# Patient Record
Sex: MALE | Race: ASIAN | URBAN - METROPOLITAN AREA
[De-identification: names, ages, dates, MRNs, and addresses within clinical notes are randomized per-mention and may not be internally consistent; named-entity substitution may affect disease eponyms.]

---

## 2018-01-14 ENCOUNTER — OFFICE VISIT (OUTPATIENT)
Dept: URGENT CARE | Facility: URGENT CARE | Age: 28
End: 2018-01-14
Payer: COMMERCIAL

## 2018-01-14 VITALS
TEMPERATURE: 98 F | DIASTOLIC BLOOD PRESSURE: 70 MMHG | SYSTOLIC BLOOD PRESSURE: 104 MMHG | HEART RATE: 56 BPM | BODY MASS INDEX: 25.76 KG/M2 | WEIGHT: 170 LBS | OXYGEN SATURATION: 97 % | HEIGHT: 68 IN

## 2018-01-14 DIAGNOSIS — S05.8X2A EYE ABRASION, LEFT, INITIAL ENCOUNTER: Primary | ICD-10-CM

## 2018-01-14 PROCEDURE — 99203 OFFICE O/P NEW LOW 30 MIN: CPT | Performed by: FAMILY MEDICINE

## 2018-01-14 ASSESSMENT — ENCOUNTER SYMPTOMS
VOMITING: 0
EYE REDNESS: 1
PHOTOPHOBIA: 1
DOUBLE VISION: 0
EYE DISCHARGE: 1
BLURRED VISION: 1
ABDOMINAL PAIN: 0
CONSTIPATION: 0
EYE PAIN: 1
FEVER: 0
NAUSEA: 0
COUGH: 0
DIARRHEA: 0
SHORTNESS OF BREATH: 0
CHILLS: 0
DYSURIA: 0
FREQUENCY: 0

## 2018-01-14 NOTE — MR AVS SNAPSHOT
After Visit Summary   1/14/2018    Ricardo López    MRN: [id number]           Patient Information     Date Of Birth          1990        Visit Information        Provider Department      1/14/2018 10:15 AM Wm Devine MD [facility]        Today's Diagnoses     Eye abrasion, left, initial encounter    -  1      Care Instructions      Corneal Abrasion    You have received a scratch or scrape (abrasion) to your cornea. The cornea is the clear part in the front of the eye. This sensitive area is very painful when injured. You may make tears frequently, and your vision may be blurry until the injury heals. You may be sensitive to light.  This part of the body heals quickly. You can expect the pain to go away within 24 to 48 hours. If the abrasion is large or deep, your doctor may apply an eye patch, although this is not always done. An antibiotic ointment or eye drops may also be used to prevent infection.  Numbing drops may be used to relieve the pain temporarily so that your eyes can be examined. But these drops can t be prescribed for home use because that would prevent healing and lead to more serious problems. Also, if you can t feel your eye, there is a chance of accidentally injuring it further without knowing it.  Home care    A cold pack may be applied over the eye (or eye patch) for 20 minutes at a time, to reduce pain. To make a cold pack, put ice cubes in a plastic bag that seals at the top. Wrap the bag in a clean, thin towel or cloth.    You may use acetaminophen or ibuprofen to control pain, unless another pain medicine was prescribed. Note: If you have chronic liver or kidney disease or have ever had a stomach ulcer or GI bleeding, talk with your doctor before using these medicines.    Rest your eyes and don t read until symptoms are gone.    If you use contact lenses, don t wear them until all symptoms are gone.    If your vision is affected by the corneal  abrasion or if an eye patch was applied, don t drive a motor vehicle or operate machinery until all symptoms are gone. You may have trouble judging distances using only one eye.    If your eyes are sensitive to light, try wearing sunglasses, or stay indoors until symptoms go away.  Follow-up care  Follow up with your healthcare provider, or as advised.    If no patch was put on your eye and the pain continues for more than 48 hours, you should have another exam. Contact your healthcare provider to arrange this.    If your eye was patched and you were asked to remove the patch yourself, see your healthcare provider. Contact your healthcare provider if you still have pain after the patch is removed.    If you were given a return appointment for patch removal and re-examination, be sure to keep the appointment. Leaving the patch in place longer than advised could be harmful.  When to seek medical advice  Call your healthcare provider right away if any of these occur.    Increasing eye pain or pain that does not improve after 24 hours    Discharge from the eye    Increasing redness of the eye or swelling of the eyelids    Worsening vision    Symptoms get worse after the abrasion has healed  Date Last Reviewed: 7/1/2017 2000-2017 [employer]. [address] [place] [address]. All rights reserved. This information is not intended as a substitute for professional medical care. Always follow your healthcare professional's instructions.                Follow-ups after your visit        Additional Services     OPHTHALMOLOGY ADULT REFERRAL       Your provider has referred you to: [place]: [facility] [phone]   [email or web address]  UMP: [facility] [phone]   [email or web address]  UMP: [facility] [phone]    "[email or web address]  [facility] [phone]   [email or web address]    Please be aware that coverage of these services is subject to the terms and limitations of your health insurance plan.  Call member services at your health plan with any benefit or coverage questions.      Please bring the following with you to your appointment:    (1) Any X-Rays, CTs or MRIs which have been performed.  Contact the facility where they were done to arrange for  prior to your scheduled appointment.    (2) List of current medications  (3) This referral request   (4) Any documents/labs given to you for this referral                  Follow-up notes from your care team     Return if symptoms worsen or fail to improve.      Who to contact     If you have questions or need follow up information about today's clinic visit or your schedule please contact [facility] directly at [phone].  Normal or non-critical lab and imaging results will be communicated to you by Immediatelyhart, letter or phone within 4 business days after the clinic has received the results. If you do not hear from us within 7 days, please contact the clinic through Immediatelyhart or phone. If you have a critical or abnormal lab result, we will notify you by phone as soon as possible.  Submit refill requests through SpaceClaim or call your pharmacy and they will forward the refill request to us. Please allow 3 business days for your refill to be completed.          Additional Information About Your Visit        SpaceClaim Information     SpaceClaim lets you send messages to your doctor, view your test results, renew your prescriptions, schedule appointments and more. To sign up, go to [email or web address] . Click on \"Log in\" on the left side of the screen, which will take you to the Welcome page. Then click on \"Sign up Now\" on the right " "side of the page.     You will be asked to enter the access code listed below, as well as some personal information. Please follow the directions to create your username and password.     Your access code is: [email or web address]  Expires: 2018 11:14 AM     Your access code will  in 90 days. If you need help or a new code, please call your [facility] clinic or [phone].        Care EveryWhere ID     This is your Care EveryWhere ID. This could be used by other organizations to access your [facility] medical records  [id number]        Your Vitals Were     Pulse Temperature Height Pulse Oximetry BMI (Body Mass Index)       56 98  F (36.7  C) (Oral) 5' 8\" (1.727 m) 97% 25.85 kg/m2        Blood Pressure from Last 3 Encounters:   18 104/70   11 100/70    Weight from Last 3 Encounters:   18 170 lb (77.1 kg)              We Performed the Following     OPHTHALMOLOGY ADULT REFERRAL          Today's Medication Changes          These changes are accurate as of: 18 11:14 AM.  If you have any questions, ask your nurse or doctor.               Start taking these medicines.        Dose/Directions    Carboxymethylcellulose Sod PF 1 % ophthalmic solution   Commonly known as:  CELLUVISC/REFRESH LIQUIGEL   Used for:  Eye abrasion, left, initial encounter   Started by:  Wm Devine MD        Dose:  1 drop   Place 1 drop into both eyes 3 times daily   Quantity:  1 Bottle   Refills:  1            Where to get your medicines      These medications were sent to [id number] - [place], [place] - [address], [place] [address]     Phone:  [phone]     Carboxymethylcellulose Sod PF 1 % ophthalmic solution                Primary Care Provider    None Specified       No primary provider on file.        Equal Access to Services     JEROMY LOPEZ AH: Obie Adam, cyndi oneil, juanito johnson " anabriana juliannemadaybarrera swartzJonathanaaava ah. [facility] [phone].    ATENCIÓN: Si vanessala rc, tiene a montaño disposición servicios gratuitos de asistencia lingüística. Bhavik al [phone].    We comply with applicable federal civil rights laws and [place] laws. We do not discriminate on the basis of race, color, national origin, age, disability, sex, sexual orientation, or gender identity.            Thank you!     Thank you for choosing [facility]  for your care. Our goal is always to provide you with excellent care. Hearing back from our patients is one way we can continue to improve our services. Please take a few minutes to complete the written survey that you may receive in the mail after your visit with us. Thank you!             Your Updated Medication List - Protect others around you: Learn how to safely use, store and throw away your medicines at www.disposemymeds.org.          This list is accurate as of: 1/14/18 11:14 AM.  Always use your most recent med list.                   Brand Name Dispense Instructions for use Diagnosis    ADVIL 200 MG tablet   Generic drug:  ibuprofen      Take 200 mg by mouth every 4 hours as needed.        Carboxymethylcellulose Sod PF 1 % ophthalmic solution    CELLUVISC/REFRESH LIQUIGEL    1 Bottle    Place 1 drop into both eyes 3 times daily    Eye abrasion, left, initial encounter       HYDROcodone-acetaminophen 5-500 MG per tablet    VICODIN    30 tablet    Take 1-2 tablets by mouth every 6 hours as needed for pain.    Tooth infection

## 2018-01-14 NOTE — NURSING NOTE
VISION   No corrective lenses  Tool used: Rodriguez   Right eye:        10/10 (20/20)  Left eye:          10/12.5 (20/25)  Visual Acuity: Veronica baldwin cma

## 2018-01-14 NOTE — NURSING NOTE
"Chief Complaint   Patient presents with     Urgent Care     Eye Problem     started last nightl left eye, red and swollen today, white discharge.        Initial /70  Pulse 56  Temp 98  F (36.7  C) (Oral)  Ht 5' 8\" (1.727 m)  Wt 170 lb (77.1 kg)  SpO2 97%  BMI 25.85 kg/m2 Estimated body mass index is 25.85 kg/(m^2) as calculated from the following:    Height as of this encounter: 5' 8\" (1.727 m).    Weight as of this encounter: 170 lb (77.1 kg).  Medication Reconciliation: complete  "

## 2018-01-14 NOTE — PATIENT INSTRUCTIONS
Corneal Abrasion    You have received a scratch or scrape (abrasion) to your cornea. The cornea is the clear part in the front of the eye. This sensitive area is very painful when injured. You may make tears frequently, and your vision may be blurry until the injury heals. You may be sensitive to light.  This part of the body heals quickly. You can expect the pain to go away within 24 to 48 hours. If the abrasion is large or deep, your doctor may apply an eye patch, although this is not always done. An antibiotic ointment or eye drops may also be used to prevent infection.  Numbing drops may be used to relieve the pain temporarily so that your eyes can be examined. But these drops can t be prescribed for home use because that would prevent healing and lead to more serious problems. Also, if you can t feel your eye, there is a chance of accidentally injuring it further without knowing it.  Home care    A cold pack may be applied over the eye (or eye patch) for 20 minutes at a time, to reduce pain. To make a cold pack, put ice cubes in a plastic bag that seals at the top. Wrap the bag in a clean, thin towel or cloth.    You may use acetaminophen or ibuprofen to control pain, unless another pain medicine was prescribed. Note: If you have chronic liver or kidney disease or have ever had a stomach ulcer or GI bleeding, talk with your doctor before using these medicines.    Rest your eyes and don t read until symptoms are gone.    If you use contact lenses, don t wear them until all symptoms are gone.    If your vision is affected by the corneal abrasion or if an eye patch was applied, don t drive a motor vehicle or operate machinery until all symptoms are gone. You may have trouble judging distances using only one eye.    If your eyes are sensitive to light, try wearing sunglasses, or stay indoors until symptoms go away.  Follow-up care  Follow up with your healthcare provider, or as advised.    If no patch was put on  your eye and the pain continues for more than 48 hours, you should have another exam. Contact your healthcare provider to arrange this.    If your eye was patched and you were asked to remove the patch yourself, see your healthcare provider. Contact your healthcare provider if you still have pain after the patch is removed.    If you were given a return appointment for patch removal and re-examination, be sure to keep the appointment. Leaving the patch in place longer than advised could be harmful.  When to seek medical advice  Call your healthcare provider right away if any of these occur.    Increasing eye pain or pain that does not improve after 24 hours    Discharge from the eye    Increasing redness of the eye or swelling of the eyelids    Worsening vision    Symptoms get worse after the abrasion has healed  Date Last Reviewed: 7/1/2017 2000-2017 [employer]. [address] [place] [address], [place] [address]. All rights reserved. This information is not intended as a substitute for professional medical care. Always follow your healthcare professional's instructions.

## 2018-01-14 NOTE — PROGRESS NOTES
"SUBJECTIVE:  Chief Complaint:   Chief Complaint   Patient presents with     Urgent Care     Eye Problem     started last nightl left eye, red and swollen today, white discharge.      History of Present Illness:  Ricardo López is a 27 year old male who presents complaining of moderate left eye injury scratched eye for 1 day(s).   Onset/timing: sudden.  Since then eye watering, had discharge.  Pt can see, some blurry vision, no double vision.  No injury to eye before, has 20/20 vision baseline no contacts. Tried Visine for eye didn't help.  Can move eye w/out problem, no double vision  Associated Signs and Symptoms: none  Treatment measures tried include: none  Contact wearer : No    No past medical history on file.  Current Outpatient Prescriptions   Medication Sig Dispense Refill     ibuprofen (ADVIL) 200 MG tablet Take 200 mg by mouth every 4 hours as needed.       hydrocodone-acetaminophen (VICODIN) 5-500 MG per tablet Take 1-2 tablets by mouth every 6 hours as needed for pain. 30 tablet 0        ROS:  Review of Systems   Constitutional: Negative for chills and fever.   Eyes: Positive for blurred vision, photophobia, pain, discharge and redness. Negative for double vision.   Respiratory: Negative for cough and shortness of breath.    Cardiovascular: Negative for chest pain.   Gastrointestinal: Negative for abdominal pain, constipation, diarrhea, nausea and vomiting.   Genitourinary: Negative for dysuria and frequency.   Skin: Negative for rash.         OBJECTIVE:  /70  Pulse 56  Temp 98  F (36.7  C) (Oral)  Ht 5' 8\" (1.727 m)  Wt 170 lb (77.1 kg)  SpO2 97%  BMI 25.85 kg/m2  General: no acute distress  Physical Exam   Constitutional: He is oriented to person, place, and time. He appears well-developed and well-nourished. No distress.   HENT:   Head: Normocephalic and atraumatic.   Right Ear: External ear normal.   Left Ear: External ear normal.   Mouth/Throat: Oropharynx is clear and moist. No oropharyngeal " exudate.   Eyes: EOM are normal. Pupils are equal, round, and reactive to light. Left eye exhibits discharge.   Injected left conjunctiva   Neck: Neck supple.   Cardiovascular: Normal rate, regular rhythm, normal heart sounds and intact distal pulses.  Exam reveals no gallop and no friction rub.    No murmur heard.  Abdominal: Soft. Bowel sounds are normal. He exhibits no distension. There is no tenderness. There is no guarding.   Lymphadenopathy:     He has no cervical adenopathy.   Neurological: He is alert and oriented to person, place, and time.   Skin: Skin is warm and dry. No rash noted. He is not diaphoretic.   Vitals reviewed.    Eye exam: right eye normal lid, conjunctiva, cornea, pupil and fundus, left eye abnormal findings: scleral abrasion noted on fluorescein examination, cornea clear.     ASSESSMENT:  Eye injury, likely conjunctival injury with normal vision bilaterally.      PLAN:  Hygiene measures discussed. Referral to Opthalmologist. Eye cares including night patch, moisturizing drops and no Visine.  Sx should improve over the next few days if not see Ophthalmology    See orders in [facility]    Wm Devine

## 2018-06-20 ENCOUNTER — OFFICE VISIT (OUTPATIENT)
Dept: FAMILY MEDICINE | Facility: CLINIC | Age: 28
End: 2018-06-20
Payer: COMMERCIAL

## 2018-06-20 VITALS
HEART RATE: 53 BPM | HEIGHT: 68 IN | BODY MASS INDEX: 25.13 KG/M2 | WEIGHT: 165.8 LBS | TEMPERATURE: 98.1 F | SYSTOLIC BLOOD PRESSURE: 128 MMHG | RESPIRATION RATE: 18 BRPM | DIASTOLIC BLOOD PRESSURE: 75 MMHG

## 2018-06-20 DIAGNOSIS — Z01.84 IMMUNITY STATUS TESTING: ICD-10-CM

## 2018-06-20 DIAGNOSIS — Z00.00 ROUTINE GENERAL MEDICAL EXAMINATION AT A HEALTH CARE FACILITY: Primary | ICD-10-CM

## 2018-06-20 DIAGNOSIS — L70.9 ACNE, UNSPECIFIED ACNE TYPE: ICD-10-CM

## 2018-06-20 PROCEDURE — 99395 PREV VISIT EST AGE 18-39: CPT | Performed by: NURSE PRACTITIONER

## 2018-06-20 PROCEDURE — 86787 VARICELLA-ZOSTER ANTIBODY: CPT | Performed by: NURSE PRACTITIONER

## 2018-06-20 PROCEDURE — 86706 HEP B SURFACE ANTIBODY: CPT | Performed by: NURSE PRACTITIONER

## 2018-06-20 PROCEDURE — 86774 TETANUS ANTIBODY: CPT | Performed by: NURSE PRACTITIONER

## 2018-06-20 PROCEDURE — 36415 COLL VENOUS BLD VENIPUNCTURE: CPT | Performed by: NURSE PRACTITIONER

## 2018-06-20 PROCEDURE — 86765 RUBEOLA ANTIBODY: CPT | Performed by: NURSE PRACTITIONER

## 2018-06-20 PROCEDURE — 86762 RUBELLA ANTIBODY: CPT | Performed by: NURSE PRACTITIONER

## 2018-06-20 PROCEDURE — 86735 MUMPS ANTIBODY: CPT | Performed by: NURSE PRACTITIONER

## 2018-06-20 RX ORDER — MINOCYCLINE HYDROCHLORIDE 100 MG/1
100 CAPSULE ORAL 2 TIMES DAILY
Qty: 180 CAPSULE | Refills: 0 | Status: SHIPPED | OUTPATIENT
Start: 2018-06-20

## 2018-06-20 NOTE — PROGRESS NOTES
SUBJECTIVE:   CC: Ricardo López is an 28 year old male who presents for preventative health visit.     Physical   Annual:     Getting at least 3 servings of Calcium per day::  Yes    Bi-annual eye exam::  Yes    Dental care twice a year::  NO    Sleep apnea or symptoms of sleep apnea::  None    Diet::  Regular (no restrictions)    Taking medications regularly::  Yes    Medication side effects::  None    Additional concerns today::  No            Breaking out on his chin a lot.    Used minocycline in the past with good results.    Has been off this x 2 years.    Would like to restart minocycline - liked the oral meds the best.      Today's PHQ-2 Score:   PHQ-2 ( 1999 Pfizer) 6/20/2018   Q1: Little interest or pleasure in doing things 0   Q2: Feeling down, depressed or hopeless 0   PHQ-2 Score 0   Q1: Little interest or pleasure in doing things Not at all   Q2: Feeling down, depressed or hopeless Not at all   PHQ-2 Score 0       Abuse: Current or Past(Physical, Sexual or Emotional)- No  Do you feel safe in your environment - Yes    Social History   Substance Use Topics     Smoking status: Never Smoker     Smokeless tobacco: Never Used     Alcohol use No     Alcohol Use 6/20/2018   If you drink alcohol do you typically have greater than 3 drinks per day OR greater than 7 drinks per week? No     Last PSA: No results found for: PSA    Reviewed orders with patient. Reviewed health maintenance and updated orders accordingly - Yes    Reviewed and updated as needed this visit by clinical staff  Tobacco  Allergies  Meds  Problems  Med Hx  Surg Hx  Fam Hx  Soc Hx          Reviewed and updated as needed this visit by Provider  Tobacco  Allergies  Meds  Problems  Med Hx  Surg Hx  Fam Hx  Soc Hx         Review of Systems  CONSTITUTIONAL: NEGATIVE for fever, chills, change in weight  INTEGUMENTARY/SKIN: NEGATIVE for worrisome rashes, moles or lesions  EYES: NEGATIVE for vision changes or irritation  ENT: NEGATIVE for  "ear, mouth and throat problems  RESP: NEGATIVE for significant cough or SOB  CV: NEGATIVE for chest pain, palpitations or peripheral edema  GI: NEGATIVE for nausea, abdominal pain, heartburn, or change in bowel habits   male: negative for dysuria, hematuria, decreased urinary stream, erectile dysfunction, urethral discharge  MUSCULOSKELETAL: NEGATIVE for significant arthralgias or myalgia  NEURO: NEGATIVE for weakness, dizziness or paresthesias  PSYCHIATRIC: NEGATIVE for changes in mood or affect    OBJECTIVE:   /75  Pulse 53  Temp 98.1  F (36.7  C) (Oral)  Resp 18  Ht 5' 8\" (1.727 m)  Wt 165 lb 12.8 oz (75.2 kg)  BMI 25.21 kg/m2    Physical Exam  GENERAL: healthy, alert and no distress  EYES: Eyes grossly normal to inspection, PERRL and conjunctivae and sclerae normal  HENT: ear canals and TM's normal, nose and mouth without ulcers or lesions  NECK: no adenopathy, no asymmetry, masses, or scars and thyroid normal to palpation  RESP: lungs clear to auscultation - no rales, rhonchi or wheezes  CV: regular rate and rhythm, normal S1 S2, no S3 or S4, no murmur, click or rub, no peripheral edema and peripheral pulses strong  ABDOMEN: soft, nontender, no hepatosplenomegaly, no masses and bowel sounds normal  MS: no gross musculoskeletal defects noted, no edema  SKIN: pustular acne on chin no suspicious lesions or rashes  NEURO: Normal strength and tone, mentation intact and speech normal  PSYCH: mentation appears normal, affect normal/bright    ASSESSMENT/PLAN:       ICD-10-CM    1. Routine general medical examination at a health care facility Z00.00    2. Immunity status testing Z01.84 Rubeola Antibody IgG     Rubella Antibody IgG Quantitative     Hepatitis B Surface Antibody     Mumps Antibody IgG     Varicella Zoster Virus Antibody IgG     Tetanus antibody   3. Acne, unspecified acne type L70.9 minocycline (MINOCIN/DYNACIN) 100 MG capsule      well male, not fasting for labs.  Encouraged to continue " "exercise and healthy diet choices.      Titers drawn for [employer] school at [facility]     Information given about the nature of acne and treatment.  Recheck in 4 months      COUNSELING:   Reviewed preventive health counseling, as reflected in patient instructions         reports that he has never smoked. He has never used smokeless tobacco.    Estimated body mass index is 25.21 kg/(m^2) as calculated from the following:    Height as of this encounter: 5' 8\" (1.727 m).    Weight as of this encounter: 165 lb 12.8 oz (75.2 kg).       Counseling Resources:  ATP IV Guidelines  Pooled Cohorts Equation Calculator  FRAX Risk Assessment  ICSI Preventive Guidelines  Dietary Guidelines for Americans, 2010  USDA's MyPlate  ASA Prophylaxis  Lung CA Screening    MATILDA Shah CNP  [facility]  Answers for HPI/ROS submitted by the patient on 6/20/2018   PHQ-2 Score: 0    "

## 2018-06-20 NOTE — MR AVS SNAPSHOT
After Visit Summary   6/20/2018    Ricardo López    MRN: [id number]           Patient Information     Date Of Birth          1990        Visit Information        Provider Department      6/20/2018 12:40 PM Beckie Dent APRN CNP [facility]        Today's Diagnoses     Routine general medical examination at a health care facility    -  1    Immunity status testing        Acne, unspecified acne type          Care Instructions      Preventive Health Recommendations  Male Ages 26 - 39    Yearly exam:             See your health care provider every year in order to  o   Review health changes.   o   Discuss preventive care.    o   Review your medicines if your doctor has prescribed any.    You should be tested each year for STDs (sexually transmitted diseases), if you re at risk.     After age 35, talk to your provider about cholesterol testing. If you are at risk for heart disease, have your cholesterol tested at least every 5 years.     If you are at risk for diabetes, you should have a diabetes test (fasting glucose).  Shots: Get a flu shot each year. Get a tetanus shot every 10 years.     Nutrition:    Eat at least 5 servings of fruits and vegetables daily.     Eat whole-grain bread, whole-wheat pasta and brown rice instead of white grains and rice.     Talk to your provider about Calcium and Vitamin D.     Lifestyle    Exercise for at least 150 minutes a week (30 minutes a day, 5 days a week). This will help you control your weight and prevent disease.     Limit alcohol to one drink per day.     No smoking.     Wear sunscreen to prevent skin cancer.     See your dentist every six months for an exam and cleaning.             Follow-ups after your visit        Who to contact     If you have questions or need follow up information about today's clinic visit or your schedule please contact [facility] directly at [phone].  Normal or non-critical lab and  "imaging results will be communicated to you by MyChart, letter or phone within 4 business days after the clinic has received the results. If you do not hear from us within 7 days, please contact the clinic through [employer] or phone. If you have a critical or abnormal lab result, we will notify you by phone as soon as possible.  Submit refill requests through [employer] or call your pharmacy and they will forward the refill request to us. Please allow 3 business days for your refill to be completed.          Additional Information About Your Visit        [employer] Information     [employer] lets you send messages to your doctor, view your test results, renew your prescriptions, schedule appointments and more. To sign up, go to [email or web address] . Click on \"Log in\" on the left side of the screen, which will take you to the Welcome page. Then click on \"Sign up Now\" on the right side of the page.     You will be asked to enter the access code listed below, as well as some personal information. Please follow the directions to create your username and password.     Your access code is: [email or web address]  Expires: 2018  1:39 PM     Your access code will  in 90 days. If you need help or a new code, please call your [facility] clinic or [phone].        Care EveryWhere ID     This is your Care EveryWhere ID. This could be used by other organizations to access your [facility] medical records  [id number]        Your Vitals Were     Pulse Temperature Respirations Height BMI (Body Mass Index)       53 98.1  F (36.7  C) (Oral) 18 5' 8\" (1.727 m) 25.21 kg/m2        Blood Pressure from Last 3 Encounters:   18 128/75   18 104/70   11 100/70    Weight from Last 3 Encounters:   18 165 lb 12.8 oz (75.2 kg)   18 170 lb (77.1 kg)              We Performed the Following     Hepatitis B Surface Antibody     Mumps Antibody IgG     Rubella Antibody IgG Quantitative     Rubeola Antibody IgG     Tetanus " antibody     Varicella Zoster Virus Antibody IgG          Today's Medication Changes          These changes are accurate as of 6/20/18  1:39 PM.  If you have any questions, ask your nurse or doctor.               Start taking these medicines.        Dose/Directions    minocycline 100 MG capsule   Commonly known as:  MINOCIN/DYNACIN   Used for:  Acne, unspecified acne type   Started by:  Beckie Dent APRN CNP        Dose:  100 mg   Take 1 capsule (100 mg) by mouth 2 times daily   Quantity:  180 capsule   Refills:  0            Where to get your medicines      These medications were sent to [id number] - [place], [place] - [address], [place] [address]     Phone:  [phone]     minocycline 100 MG capsule                Primary Care Provider Fax #    Physician No Ref-Primary [phone]       No address on file        Equal Access to Services     JEROMY LOPEZ : Obie Adam, waaxlili oneil, qaybalice kaalmalili french, juanito clark . So [facility] [phone].    ATENCIÓN: Si habla español, tiene a montaño disposición servicios gratuitos de asistencia lingüística. Bhavik al [phone].    We comply with applicable federal civil rights laws and [place] laws. We do not discriminate on the basis of race, color, national origin, age, disability, sex, sexual orientation, or gender identity.            Thank you!     Thank you for choosing [facility]  for your care. Our goal is always to provide you with excellent care. Hearing back from our patients is one way we can continue to improve our services. Please take a few minutes to complete the written survey that you may receive in the mail after your visit with us. Thank you!             Your Updated Medication List - Protect others around you: Learn how to safely use, store and throw away your medicines at www.disposemymeds.org.           This list is accurate as of 6/20/18  1:39 PM.  Always use your most recent med list.                   Brand Name Dispense Instructions for use Diagnosis    ADVIL 200 MG tablet   Generic drug:  ibuprofen      Take 200 mg by mouth every 4 hours as needed.        Carboxymethylcellulose Sod PF 1 % ophthalmic solution    CELLUVISC/REFRESH LIQUIGEL    1 Bottle    Place 1 drop into both eyes 3 times daily    Eye abrasion, left, initial encounter       HYDROcodone-acetaminophen 5-500 MG per tablet    VICODIN    30 tablet    Take 1-2 tablets by mouth every 6 hours as needed for pain.    Tooth infection       minocycline 100 MG capsule    MINOCIN/DYNACIN    180 capsule    Take 1 capsule (100 mg) by mouth 2 times daily    Acne, unspecified acne type

## 2018-06-20 NOTE — LETTER
June 25, 2018      Ricardo López  [address] [place] [address]        Dear ,    We are writing to inform you of your test results.    You titers show you have probable immunity to rubeola, mumps, rubella, and varicella  You are NOT immune to Hepatitis B.     Please let me know if you have any questions.     Resulted Orders   Rubeola Antibody IgG   Result Value Ref Range    Rubeola (Measles) Antibody IgG 3.8 (H) 0.0 - 0.8 AI      Comment:      Positive, suggests prev. exposure and probable immunity  Antibody index (AI) values reflect qualitative changes in antibody   concentration that cannot be directly associated with clinical condition or   disease state.     Rubella Antibody IgG Quantitative   Result Value Ref Range    Rubella Antibody IgG Quantitative 55 IU/mL      Comment:      Positive.  Suggests previous exposure or immunization and probable immunity  Reference Range:    Unvaccinated Negative 0-7 IU/mL  Vaccinated or previous exposure Positive 10 IU/ml or greater     Hepatitis B Surface Antibody   Result Value Ref Range    Hepatitis B Surface Antibody 0.00 <8.00 m[IU]/mL      Comment:      Nonreactive, No antibody detected when the value is less than 8.00 m[IU]/mL.   Mumps Antibody IgG   Result Value Ref Range    Mumps Antibody IgG 2.4 (H) 0.0 - 0.8 AI      Comment:      Positive, suggests prev. exposure and probable immunity  Antibody index (AI) values reflect qualitative changes in antibody   concentration that cannot be directly associated with clinical condition or   disease state.     Varicella Zoster Virus Antibody IgG   Result Value Ref Range    Varicella Zoster Virus Antibody IgG 1.6 (H) 0.0 - 0.8 AI      Comment:      Positive, suggests prev. exposure and probable immunity  Antibody index (AI) values reflect qualitative changes in antibody   concentration that cannot be directly associated with clinical condition or   disease state.         If you have any questions or concerns, please  call the clinic at the number listed above.       Sincerely,        Beckie Dent, APRN CNP/nr

## 2018-06-21 LAB
HBV SURFACE AB SERPL IA-ACNC: 0 M[IU]/ML
MEV IGG SER QL IA: 3.8 AI (ref 0–0.8)
MUV IGG SER QL IA: 2.4 AI (ref 0–0.8)
RUBV IGG SERPL IA-ACNC: 55 IU/ML
VZV IGG SER QL IA: 1.6 AI (ref 0–0.8)

## 2018-06-25 ENCOUNTER — TELEPHONE (OUTPATIENT)
Dept: FAMILY MEDICINE | Facility: CLINIC | Age: 28
End: 2018-06-25

## 2018-06-25 NOTE — TELEPHONE ENCOUNTER
Called pt Lv to notifi pt that form is completed and will be at the  at  for .       Leora Tesfaye MA

## 2018-06-27 LAB — C TETANI IGG SER IA-ACNC: 2.03 IU/ML

## 2020-12-09 ENCOUNTER — COMMUNICATION - HEALTHEAST (OUTPATIENT)
Dept: MIDWIFE SERVICES | Facility: CLINIC | Age: 30
End: 2020-12-09

## 2020-12-09 DIAGNOSIS — Z31.9 DESIRE FOR PREGNANCY: ICD-10-CM

## 2021-04-10 ENCOUNTER — IMMUNIZATION (OUTPATIENT)
Dept: NURSING | Facility: CLINIC | Age: 31
End: 2021-04-10
Payer: COMMERCIAL

## 2021-04-10 PROCEDURE — 91301 PR COVID VAC MODERNA 100 MCG/0.5 ML IM: CPT

## 2021-04-10 PROCEDURE — 0011A PR COVID VAC MODERNA 100 MCG/0.5 ML IM: CPT

## 2021-05-08 ENCOUNTER — IMMUNIZATION (OUTPATIENT)
Dept: NURSING | Facility: CLINIC | Age: 31
End: 2021-05-08
Payer: COMMERCIAL

## 2021-05-08 PROCEDURE — 0012A PR COVID VAC MODERNA 100 MCG/0.5 ML IM: CPT

## 2021-05-08 PROCEDURE — 91301 PR COVID VAC MODERNA 100 MCG/0.5 ML IM: CPT

## 2021-06-13 NOTE — TELEPHONE ENCOUNTER
The semen sample should be collected after two to seven days ejaculatory abstinence. If possible, the patient should collect the sample by masturbation at the doctor's office. If not possible, then the sample may be collected at home and delivered to the laboratory within an hour of collection.